# Patient Record
Sex: FEMALE | Race: OTHER | NOT HISPANIC OR LATINO | URBAN - METROPOLITAN AREA
[De-identification: names, ages, dates, MRNs, and addresses within clinical notes are randomized per-mention and may not be internally consistent; named-entity substitution may affect disease eponyms.]

---

## 2023-12-17 ENCOUNTER — EMERGENCY (EMERGENCY)
Facility: HOSPITAL | Age: 43
LOS: 1 days | Discharge: HOME | End: 2023-12-17
Attending: EMERGENCY MEDICINE
Payer: COMMERCIAL

## 2023-12-17 VITALS
TEMPERATURE: 98 F | OXYGEN SATURATION: 98 % | HEART RATE: 83 BPM | SYSTOLIC BLOOD PRESSURE: 108 MMHG | RESPIRATION RATE: 20 BRPM | WEIGHT: 164.91 LBS | DIASTOLIC BLOOD PRESSURE: 71 MMHG | HEIGHT: 66 IN

## 2023-12-17 VITALS
RESPIRATION RATE: 18 BRPM | TEMPERATURE: 98 F | OXYGEN SATURATION: 100 % | DIASTOLIC BLOOD PRESSURE: 70 MMHG | HEART RATE: 68 BPM | SYSTOLIC BLOOD PRESSURE: 107 MMHG

## 2023-12-17 LAB
ALBUMIN SERPL ELPH-MCNC: 4.2 G/DL — SIGNIFICANT CHANGE UP (ref 3.3–5)
ALBUMIN SERPL ELPH-MCNC: 4.2 G/DL — SIGNIFICANT CHANGE UP (ref 3.3–5)
ALP SERPL-CCNC: 32 U/L — LOW (ref 40–120)
ALP SERPL-CCNC: 32 U/L — LOW (ref 40–120)
ALT FLD-CCNC: 14 U/L — SIGNIFICANT CHANGE UP (ref 10–45)
ALT FLD-CCNC: 14 U/L — SIGNIFICANT CHANGE UP (ref 10–45)
ANION GAP SERPL CALC-SCNC: 9 MMOL/L — SIGNIFICANT CHANGE UP (ref 5–17)
APPEARANCE UR: CLEAR — SIGNIFICANT CHANGE UP
APPEARANCE UR: CLEAR — SIGNIFICANT CHANGE UP
AST SERPL-CCNC: 31 U/L — SIGNIFICANT CHANGE UP (ref 10–40)
AST SERPL-CCNC: 31 U/L — SIGNIFICANT CHANGE UP (ref 10–40)
BACTERIA # UR AUTO: NEGATIVE /HPF — SIGNIFICANT CHANGE UP
BACTERIA # UR AUTO: NEGATIVE /HPF — SIGNIFICANT CHANGE UP
BASE EXCESS BLDV CALC-SCNC: 2.6 MMOL/L — SIGNIFICANT CHANGE UP (ref -2–3)
BASE EXCESS BLDV CALC-SCNC: 2.6 MMOL/L — SIGNIFICANT CHANGE UP (ref -2–3)
BASOPHILS # BLD AUTO: 0.02 K/UL — SIGNIFICANT CHANGE UP (ref 0–0.2)
BASOPHILS # BLD AUTO: 0.02 K/UL — SIGNIFICANT CHANGE UP (ref 0–0.2)
BASOPHILS NFR BLD AUTO: 0.4 % — SIGNIFICANT CHANGE UP (ref 0–2)
BASOPHILS NFR BLD AUTO: 0.4 % — SIGNIFICANT CHANGE UP (ref 0–2)
BILIRUB SERPL-MCNC: 0.2 MG/DL — SIGNIFICANT CHANGE UP (ref 0.2–1.2)
BILIRUB SERPL-MCNC: 0.2 MG/DL — SIGNIFICANT CHANGE UP (ref 0.2–1.2)
BILIRUB UR-MCNC: NEGATIVE — SIGNIFICANT CHANGE UP
BILIRUB UR-MCNC: NEGATIVE — SIGNIFICANT CHANGE UP
BUN SERPL-MCNC: 14 MG/DL — SIGNIFICANT CHANGE UP (ref 7–23)
BUN SERPL-MCNC: 14 MG/DL — SIGNIFICANT CHANGE UP (ref 7–23)
BUN SERPL-MCNC: 16 MG/DL — SIGNIFICANT CHANGE UP (ref 7–23)
BUN SERPL-MCNC: 16 MG/DL — SIGNIFICANT CHANGE UP (ref 7–23)
CA-I SERPL-SCNC: 1.23 MMOL/L — SIGNIFICANT CHANGE UP (ref 1.15–1.33)
CA-I SERPL-SCNC: 1.23 MMOL/L — SIGNIFICANT CHANGE UP (ref 1.15–1.33)
CALCIUM SERPL-MCNC: 9.3 MG/DL — SIGNIFICANT CHANGE UP (ref 8.4–10.5)
CANCER AG125 SERPL-ACNC: 8 U/ML — SIGNIFICANT CHANGE UP
CANCER AG125 SERPL-ACNC: 8 U/ML — SIGNIFICANT CHANGE UP
CANCER AG19-9 SERPL-ACNC: 8 U/ML — SIGNIFICANT CHANGE UP
CANCER AG19-9 SERPL-ACNC: 8 U/ML — SIGNIFICANT CHANGE UP
CAST: 0 /LPF — SIGNIFICANT CHANGE UP (ref 0–4)
CAST: 0 /LPF — SIGNIFICANT CHANGE UP (ref 0–4)
CEA SERPL-MCNC: 0.6 NG/ML — SIGNIFICANT CHANGE UP (ref 0–3.8)
CEA SERPL-MCNC: 0.6 NG/ML — SIGNIFICANT CHANGE UP (ref 0–3.8)
CHLORIDE BLDV-SCNC: 104 MMOL/L — SIGNIFICANT CHANGE UP (ref 96–108)
CHLORIDE BLDV-SCNC: 104 MMOL/L — SIGNIFICANT CHANGE UP (ref 96–108)
CHLORIDE SERPL-SCNC: 104 MMOL/L — SIGNIFICANT CHANGE UP (ref 96–108)
CHLORIDE SERPL-SCNC: 104 MMOL/L — SIGNIFICANT CHANGE UP (ref 96–108)
CHLORIDE SERPL-SCNC: 106 MMOL/L — SIGNIFICANT CHANGE UP (ref 96–108)
CHLORIDE SERPL-SCNC: 106 MMOL/L — SIGNIFICANT CHANGE UP (ref 96–108)
CO2 BLDV-SCNC: 30 MMOL/L — HIGH (ref 22–26)
CO2 BLDV-SCNC: 30 MMOL/L — HIGH (ref 22–26)
CO2 SERPL-SCNC: 24 MMOL/L — SIGNIFICANT CHANGE UP (ref 22–31)
CO2 SERPL-SCNC: 24 MMOL/L — SIGNIFICANT CHANGE UP (ref 22–31)
CO2 SERPL-SCNC: 26 MMOL/L — SIGNIFICANT CHANGE UP (ref 22–31)
CO2 SERPL-SCNC: 26 MMOL/L — SIGNIFICANT CHANGE UP (ref 22–31)
COLOR SPEC: YELLOW — SIGNIFICANT CHANGE UP
COLOR SPEC: YELLOW — SIGNIFICANT CHANGE UP
CREAT SERPL-MCNC: 0.62 MG/DL — SIGNIFICANT CHANGE UP (ref 0.5–1.3)
CREAT SERPL-MCNC: 0.62 MG/DL — SIGNIFICANT CHANGE UP (ref 0.5–1.3)
CREAT SERPL-MCNC: 0.68 MG/DL — SIGNIFICANT CHANGE UP (ref 0.5–1.3)
CREAT SERPL-MCNC: 0.68 MG/DL — SIGNIFICANT CHANGE UP (ref 0.5–1.3)
DIFF PNL FLD: NEGATIVE — SIGNIFICANT CHANGE UP
DIFF PNL FLD: NEGATIVE — SIGNIFICANT CHANGE UP
EGFR: 112 ML/MIN/1.73M2 — SIGNIFICANT CHANGE UP
EGFR: 112 ML/MIN/1.73M2 — SIGNIFICANT CHANGE UP
EGFR: 115 ML/MIN/1.73M2 — SIGNIFICANT CHANGE UP
EGFR: 115 ML/MIN/1.73M2 — SIGNIFICANT CHANGE UP
EOSINOPHIL # BLD AUTO: 0.05 K/UL — SIGNIFICANT CHANGE UP (ref 0–0.5)
EOSINOPHIL # BLD AUTO: 0.05 K/UL — SIGNIFICANT CHANGE UP (ref 0–0.5)
EOSINOPHIL NFR BLD AUTO: 1.1 % — SIGNIFICANT CHANGE UP (ref 0–6)
EOSINOPHIL NFR BLD AUTO: 1.1 % — SIGNIFICANT CHANGE UP (ref 0–6)
GAS PNL BLDV: 135 MMOL/L — LOW (ref 136–145)
GAS PNL BLDV: 135 MMOL/L — LOW (ref 136–145)
GAS PNL BLDV: SIGNIFICANT CHANGE UP
GLUCOSE BLDV-MCNC: 87 MG/DL — SIGNIFICANT CHANGE UP (ref 70–99)
GLUCOSE BLDV-MCNC: 87 MG/DL — SIGNIFICANT CHANGE UP (ref 70–99)
GLUCOSE SERPL-MCNC: 80 MG/DL — SIGNIFICANT CHANGE UP (ref 70–99)
GLUCOSE SERPL-MCNC: 80 MG/DL — SIGNIFICANT CHANGE UP (ref 70–99)
GLUCOSE SERPL-MCNC: 89 MG/DL — SIGNIFICANT CHANGE UP (ref 70–99)
GLUCOSE SERPL-MCNC: 89 MG/DL — SIGNIFICANT CHANGE UP (ref 70–99)
GLUCOSE UR QL: NEGATIVE MG/DL — SIGNIFICANT CHANGE UP
GLUCOSE UR QL: NEGATIVE MG/DL — SIGNIFICANT CHANGE UP
HCG SERPL-ACNC: <2 MIU/ML — SIGNIFICANT CHANGE UP
HCG SERPL-ACNC: <2 MIU/ML — SIGNIFICANT CHANGE UP
HCO3 BLDV-SCNC: 29 MMOL/L — SIGNIFICANT CHANGE UP (ref 22–29)
HCO3 BLDV-SCNC: 29 MMOL/L — SIGNIFICANT CHANGE UP (ref 22–29)
HCT VFR BLD CALC: 37.2 % — SIGNIFICANT CHANGE UP (ref 34.5–45)
HCT VFR BLD CALC: 37.2 % — SIGNIFICANT CHANGE UP (ref 34.5–45)
HCT VFR BLDA CALC: 44 % — SIGNIFICANT CHANGE UP (ref 34.5–46.5)
HCT VFR BLDA CALC: 44 % — SIGNIFICANT CHANGE UP (ref 34.5–46.5)
HGB BLD CALC-MCNC: 14.7 G/DL — SIGNIFICANT CHANGE UP (ref 11.7–16.1)
HGB BLD CALC-MCNC: 14.7 G/DL — SIGNIFICANT CHANGE UP (ref 11.7–16.1)
HGB BLD-MCNC: 12.2 G/DL — SIGNIFICANT CHANGE UP (ref 11.5–15.5)
HGB BLD-MCNC: 12.2 G/DL — SIGNIFICANT CHANGE UP (ref 11.5–15.5)
IMM GRANULOCYTES NFR BLD AUTO: 0.2 % — SIGNIFICANT CHANGE UP (ref 0–0.9)
IMM GRANULOCYTES NFR BLD AUTO: 0.2 % — SIGNIFICANT CHANGE UP (ref 0–0.9)
KETONES UR-MCNC: ABNORMAL MG/DL
KETONES UR-MCNC: ABNORMAL MG/DL
LACTATE BLDV-MCNC: 1 MMOL/L — SIGNIFICANT CHANGE UP (ref 0.5–2)
LACTATE BLDV-MCNC: 1 MMOL/L — SIGNIFICANT CHANGE UP (ref 0.5–2)
LEUKOCYTE ESTERASE UR-ACNC: NEGATIVE — SIGNIFICANT CHANGE UP
LEUKOCYTE ESTERASE UR-ACNC: NEGATIVE — SIGNIFICANT CHANGE UP
LYMPHOCYTES # BLD AUTO: 1.82 K/UL — SIGNIFICANT CHANGE UP (ref 1–3.3)
LYMPHOCYTES # BLD AUTO: 1.82 K/UL — SIGNIFICANT CHANGE UP (ref 1–3.3)
LYMPHOCYTES # BLD AUTO: 39.5 % — SIGNIFICANT CHANGE UP (ref 13–44)
LYMPHOCYTES # BLD AUTO: 39.5 % — SIGNIFICANT CHANGE UP (ref 13–44)
MCHC RBC-ENTMCNC: 29.5 PG — SIGNIFICANT CHANGE UP (ref 27–34)
MCHC RBC-ENTMCNC: 29.5 PG — SIGNIFICANT CHANGE UP (ref 27–34)
MCHC RBC-ENTMCNC: 32.8 GM/DL — SIGNIFICANT CHANGE UP (ref 32–36)
MCHC RBC-ENTMCNC: 32.8 GM/DL — SIGNIFICANT CHANGE UP (ref 32–36)
MCV RBC AUTO: 90.1 FL — SIGNIFICANT CHANGE UP (ref 80–100)
MCV RBC AUTO: 90.1 FL — SIGNIFICANT CHANGE UP (ref 80–100)
MONOCYTES # BLD AUTO: 0.3 K/UL — SIGNIFICANT CHANGE UP (ref 0–0.9)
MONOCYTES # BLD AUTO: 0.3 K/UL — SIGNIFICANT CHANGE UP (ref 0–0.9)
MONOCYTES NFR BLD AUTO: 6.5 % — SIGNIFICANT CHANGE UP (ref 2–14)
MONOCYTES NFR BLD AUTO: 6.5 % — SIGNIFICANT CHANGE UP (ref 2–14)
NEUTROPHILS # BLD AUTO: 2.41 K/UL — SIGNIFICANT CHANGE UP (ref 1.8–7.4)
NEUTROPHILS # BLD AUTO: 2.41 K/UL — SIGNIFICANT CHANGE UP (ref 1.8–7.4)
NEUTROPHILS NFR BLD AUTO: 52.3 % — SIGNIFICANT CHANGE UP (ref 43–77)
NEUTROPHILS NFR BLD AUTO: 52.3 % — SIGNIFICANT CHANGE UP (ref 43–77)
NITRITE UR-MCNC: NEGATIVE — SIGNIFICANT CHANGE UP
NITRITE UR-MCNC: NEGATIVE — SIGNIFICANT CHANGE UP
NRBC # BLD: 0 /100 WBCS — SIGNIFICANT CHANGE UP (ref 0–0)
NRBC # BLD: 0 /100 WBCS — SIGNIFICANT CHANGE UP (ref 0–0)
PCO2 BLDV: 50 MMHG — HIGH (ref 39–42)
PCO2 BLDV: 50 MMHG — HIGH (ref 39–42)
PH BLDV: 7.37 — SIGNIFICANT CHANGE UP (ref 7.32–7.43)
PH BLDV: 7.37 — SIGNIFICANT CHANGE UP (ref 7.32–7.43)
PH UR: 7 — SIGNIFICANT CHANGE UP (ref 5–8)
PH UR: 7 — SIGNIFICANT CHANGE UP (ref 5–8)
PLATELET # BLD AUTO: 307 K/UL — SIGNIFICANT CHANGE UP (ref 150–400)
PLATELET # BLD AUTO: 307 K/UL — SIGNIFICANT CHANGE UP (ref 150–400)
PO2 BLDV: 29 MMHG — SIGNIFICANT CHANGE UP (ref 25–45)
PO2 BLDV: 29 MMHG — SIGNIFICANT CHANGE UP (ref 25–45)
POTASSIUM BLDV-SCNC: 5 MMOL/L — SIGNIFICANT CHANGE UP (ref 3.5–5.1)
POTASSIUM BLDV-SCNC: 5 MMOL/L — SIGNIFICANT CHANGE UP (ref 3.5–5.1)
POTASSIUM SERPL-MCNC: 4.4 MMOL/L — SIGNIFICANT CHANGE UP (ref 3.5–5.3)
POTASSIUM SERPL-MCNC: 4.4 MMOL/L — SIGNIFICANT CHANGE UP (ref 3.5–5.3)
POTASSIUM SERPL-MCNC: 5.7 MMOL/L — HIGH (ref 3.5–5.3)
POTASSIUM SERPL-MCNC: 5.7 MMOL/L — HIGH (ref 3.5–5.3)
POTASSIUM SERPL-SCNC: 4.4 MMOL/L — SIGNIFICANT CHANGE UP (ref 3.5–5.3)
POTASSIUM SERPL-SCNC: 4.4 MMOL/L — SIGNIFICANT CHANGE UP (ref 3.5–5.3)
POTASSIUM SERPL-SCNC: 5.7 MMOL/L — HIGH (ref 3.5–5.3)
POTASSIUM SERPL-SCNC: 5.7 MMOL/L — HIGH (ref 3.5–5.3)
PROT SERPL-MCNC: 7.7 G/DL — SIGNIFICANT CHANGE UP (ref 6–8.3)
PROT SERPL-MCNC: 7.7 G/DL — SIGNIFICANT CHANGE UP (ref 6–8.3)
PROT UR-MCNC: NEGATIVE MG/DL — SIGNIFICANT CHANGE UP
PROT UR-MCNC: NEGATIVE MG/DL — SIGNIFICANT CHANGE UP
RBC # BLD: 4.13 M/UL — SIGNIFICANT CHANGE UP (ref 3.8–5.2)
RBC # BLD: 4.13 M/UL — SIGNIFICANT CHANGE UP (ref 3.8–5.2)
RBC # FLD: 12 % — SIGNIFICANT CHANGE UP (ref 10.3–14.5)
RBC # FLD: 12 % — SIGNIFICANT CHANGE UP (ref 10.3–14.5)
RBC CASTS # UR COMP ASSIST: 1 /HPF — SIGNIFICANT CHANGE UP (ref 0–4)
RBC CASTS # UR COMP ASSIST: 1 /HPF — SIGNIFICANT CHANGE UP (ref 0–4)
SAO2 % BLDV: 41.7 % — LOW (ref 67–88)
SAO2 % BLDV: 41.7 % — LOW (ref 67–88)
SODIUM SERPL-SCNC: 137 MMOL/L — SIGNIFICANT CHANGE UP (ref 135–145)
SODIUM SERPL-SCNC: 137 MMOL/L — SIGNIFICANT CHANGE UP (ref 135–145)
SODIUM SERPL-SCNC: 141 MMOL/L — SIGNIFICANT CHANGE UP (ref 135–145)
SODIUM SERPL-SCNC: 141 MMOL/L — SIGNIFICANT CHANGE UP (ref 135–145)
SP GR SPEC: 1.02 — SIGNIFICANT CHANGE UP (ref 1–1.03)
SP GR SPEC: 1.02 — SIGNIFICANT CHANGE UP (ref 1–1.03)
SQUAMOUS # UR AUTO: 2 /HPF — SIGNIFICANT CHANGE UP (ref 0–5)
SQUAMOUS # UR AUTO: 2 /HPF — SIGNIFICANT CHANGE UP (ref 0–5)
UROBILINOGEN FLD QL: 0.2 MG/DL — SIGNIFICANT CHANGE UP (ref 0.2–1)
UROBILINOGEN FLD QL: 0.2 MG/DL — SIGNIFICANT CHANGE UP (ref 0.2–1)
WBC # BLD: 4.61 K/UL — SIGNIFICANT CHANGE UP (ref 3.8–10.5)
WBC # BLD: 4.61 K/UL — SIGNIFICANT CHANGE UP (ref 3.8–10.5)
WBC # FLD AUTO: 4.61 K/UL — SIGNIFICANT CHANGE UP (ref 3.8–10.5)
WBC # FLD AUTO: 4.61 K/UL — SIGNIFICANT CHANGE UP (ref 3.8–10.5)
WBC UR QL: 1 /HPF — SIGNIFICANT CHANGE UP (ref 0–5)
WBC UR QL: 1 /HPF — SIGNIFICANT CHANGE UP (ref 0–5)

## 2023-12-17 PROCEDURE — 93975 VASCULAR STUDY: CPT

## 2023-12-17 PROCEDURE — 87491 CHLMYD TRACH DNA AMP PROBE: CPT

## 2023-12-17 PROCEDURE — 93975 VASCULAR STUDY: CPT | Mod: 26

## 2023-12-17 PROCEDURE — 87800 DETECT AGNT MULT DNA DIREC: CPT

## 2023-12-17 PROCEDURE — 96375 TX/PRO/DX INJ NEW DRUG ADDON: CPT

## 2023-12-17 PROCEDURE — 82803 BLOOD GASES ANY COMBINATION: CPT

## 2023-12-17 PROCEDURE — 82378 CARCINOEMBRYONIC ANTIGEN: CPT

## 2023-12-17 PROCEDURE — 83605 ASSAY OF LACTIC ACID: CPT

## 2023-12-17 PROCEDURE — 85025 COMPLETE CBC W/AUTO DIFF WBC: CPT

## 2023-12-17 PROCEDURE — 84702 CHORIONIC GONADOTROPIN TEST: CPT

## 2023-12-17 PROCEDURE — 80048 BASIC METABOLIC PNL TOTAL CA: CPT

## 2023-12-17 PROCEDURE — 86301 IMMUNOASSAY TUMOR CA 19-9: CPT

## 2023-12-17 PROCEDURE — 87591 N.GONORRHOEAE DNA AMP PROB: CPT

## 2023-12-17 PROCEDURE — 87086 URINE CULTURE/COLONY COUNT: CPT

## 2023-12-17 PROCEDURE — 85014 HEMATOCRIT: CPT

## 2023-12-17 PROCEDURE — 99284 EMERGENCY DEPT VISIT MOD MDM: CPT

## 2023-12-17 PROCEDURE — 82947 ASSAY GLUCOSE BLOOD QUANT: CPT

## 2023-12-17 PROCEDURE — 85018 HEMOGLOBIN: CPT

## 2023-12-17 PROCEDURE — 99283 EMERGENCY DEPT VISIT LOW MDM: CPT

## 2023-12-17 PROCEDURE — 81001 URINALYSIS AUTO W/SCOPE: CPT

## 2023-12-17 PROCEDURE — 82330 ASSAY OF CALCIUM: CPT

## 2023-12-17 PROCEDURE — 82435 ASSAY OF BLOOD CHLORIDE: CPT

## 2023-12-17 PROCEDURE — 86304 IMMUNOASSAY TUMOR CA 125: CPT

## 2023-12-17 PROCEDURE — 87040 BLOOD CULTURE FOR BACTERIA: CPT

## 2023-12-17 PROCEDURE — 99285 EMERGENCY DEPT VISIT HI MDM: CPT | Mod: 25

## 2023-12-17 PROCEDURE — 84295 ASSAY OF SERUM SODIUM: CPT

## 2023-12-17 PROCEDURE — 80053 COMPREHEN METABOLIC PANEL: CPT

## 2023-12-17 PROCEDURE — 76830 TRANSVAGINAL US NON-OB: CPT

## 2023-12-17 PROCEDURE — 84132 ASSAY OF SERUM POTASSIUM: CPT

## 2023-12-17 PROCEDURE — 96374 THER/PROPH/DIAG INJ IV PUSH: CPT

## 2023-12-17 PROCEDURE — 76830 TRANSVAGINAL US NON-OB: CPT | Mod: 26

## 2023-12-17 RX ORDER — ACETAMINOPHEN 500 MG
1000 TABLET ORAL ONCE
Refills: 0 | Status: COMPLETED | OUTPATIENT
Start: 2023-12-17 | End: 2023-12-17

## 2023-12-17 RX ORDER — PHENAZOPYRIDINE HCL 100 MG
200 TABLET ORAL ONCE
Refills: 0 | Status: DISCONTINUED | OUTPATIENT
Start: 2023-12-17 | End: 2023-12-17

## 2023-12-17 RX ORDER — KETOROLAC TROMETHAMINE 30 MG/ML
15 SYRINGE (ML) INJECTION ONCE
Refills: 0 | Status: DISCONTINUED | OUTPATIENT
Start: 2023-12-17 | End: 2023-12-17

## 2023-12-17 RX ORDER — PHENAZOPYRIDINE HCL 100 MG
200 TABLET ORAL ONCE
Refills: 0 | Status: COMPLETED | OUTPATIENT
Start: 2023-12-17 | End: 2023-12-17

## 2023-12-17 RX ORDER — SODIUM CHLORIDE 9 MG/ML
1000 INJECTION INTRAMUSCULAR; INTRAVENOUS; SUBCUTANEOUS ONCE
Refills: 0 | Status: COMPLETED | OUTPATIENT
Start: 2023-12-17 | End: 2023-12-17

## 2023-12-17 RX ADMIN — SODIUM CHLORIDE 1000 MILLILITER(S): 9 INJECTION INTRAMUSCULAR; INTRAVENOUS; SUBCUTANEOUS at 10:11

## 2023-12-17 RX ADMIN — Medication 200 MILLIGRAM(S): at 11:31

## 2023-12-17 RX ADMIN — Medication 15 MILLIGRAM(S): at 10:11

## 2023-12-17 RX ADMIN — Medication 400 MILLIGRAM(S): at 10:13

## 2023-12-17 NOTE — CONSULT NOTE ADULT - ASSESSMENT
A/P: 42yo G0 (LMP 11/15) presenting with suprapubic pain, dysuria, and vaginal/vulvar pain for last several months. In the ED, VSS, PE with mild tenderness to deep palpation in lower abdomen, predominantly in suprapubic region, and no discharge or lesions seen on vaginal exam. Patient with grossly normal labs and UA. TVUS demonstrates 2.5 cm complex left ovarian cyst with peripheral nodularity. Vulvar/vaginal burning/pain possibly 2/2 vaginismus vs. irritation from shaving vs. yeast infection (less likely given no discharge on exam) vs. HSV (less likely given no herpetic lesions on exam). Patient is hemodynamically stable.  - No acute GYN intervention  - Can draw tumor markers to facilitate outpatient workup: CA-125, CEA, CA 19-9  - f/u UCx, vaginitis panel, GC/CT  - Patient may use the following pain regimen PRN: Alternate using Ibuprofen 600mg every 6 hours and Tylenol 975mg (2 extra strength or 3 regular tabs) every 6 hours for pain. Example pain schedule as follows: 9AM Tylenol 975mg, 12PM Ibuprofen 600mg, 3PM Tylenol 975mg, 6PM Ibuprofen 600mg, etc.  - Recommended silicone- and/or oil-based lubricants with intercourse. Also recommended limiting shaving of the vulva to prevent further irration.  - Recommend patient establish outpatient GYN care for management of ovarian cyst, in addition to management of chronic pelvic pain. Patient may establish care with [facility] if she desires. Please provide the clinic information below in patient's discharge paperwork:       [facility]       [address] [place] [address] [address]       [place] [address] [phone]  - Patient cleared for discharge from GYN perspective. Remainder of management per ED    Patient seen and d/w Dr. Melvi Guillermo, PGY-2 A/P: 42yo G0 (LMP 11/15) presenting with suprapubic pain, dysuria, and vaginal/vulvar pain for last several months. In the ED, VSS, PE with mild tenderness to deep palpation in lower abdomen, predominantly in suprapubic region, and no discharge or lesions seen on vaginal exam. Patient with grossly normal labs and UA. TVUS demonstrates 2.5 cm complex left ovarian cyst with peripheral nodularity. Vulvar/vaginal burning/pain possibly 2/2 vaginismus vs. irritation from shaving vs. yeast infection (less likely given no discharge on exam) vs. HSV (less likely given no herpetic lesions on exam). Patient is hemodynamically stable.  - No acute GYN intervention  - Can draw tumor markers to facilitate outpatient workup: CA-125, CEA, CA 19-9  - f/u UCx, vaginitis panel, GC/CT  - Patient may use the following pain regimen PRN: Alternate using Ibuprofen 600mg every 6 hours and Tylenol 975mg (2 extra strength or 3 regular tabs) every 6 hours for pain. Example pain schedule as follows: 9AM Tylenol 975mg, 12PM Ibuprofen 600mg, 3PM Tylenol 975mg, 6PM Ibuprofen 600mg, etc.  - Recommended silicone- and/or oil-based lubricants with intercourse. Also recommended limiting shaving of the vulva to prevent further irration.  - Recommend patient establish outpatient GYN care for management of ovarian cyst, in addition to management of chronic pelvic pain. Patient may establish care with [facility] if she desires. Please provide the clinic information below in patient's discharge paperwork:       [facility]       [address] [place] [address] [address]       [place] [address] [phone]  - Patient cleared for discharge from GYN perspective. Remainder of management per ED    Patient seen and d/w Dr. Melvi Guillermo, PGY-2 A/P: 42yo G0 (LMP 11/15) presenting with suprapubic pain, dysuria, and vaginal/vulvar pain for last several months. In the ED, VSS, PE with mild tenderness to deep palpation in lower abdomen, predominantly in suprapubic region, and no discharge or lesions seen on vaginal exam. Patient with grossly normal labs and UA. TVUS demonstrates 2.5 cm complex left ovarian cyst with peripheral nodularity. Vulvar/vaginal burning/pain possibly 2/2 vaginismus vs. irritation from shaving vs. yeast infection (less likely given no discharge on exam) vs. STI (less likely given no lesions or discharge on exam). Patient is hemodynamically stable.  - No acute GYN intervention  - Can draw tumor markers to facilitate outpatient workup: CA-125, CEA, CA 19-9  - f/u UCx, vaginitis panel, GC/CT  - Patient may use the following pain regimen PRN: Alternate using Ibuprofen 600mg every 6 hours and Tylenol 975mg (2 extra strength or 3 regular tabs) every 6 hours for pain. Example pain schedule as follows: 9AM Tylenol 975mg, 12PM Ibuprofen 600mg, 3PM Tylenol 975mg, 6PM Ibuprofen 600mg, etc.  - Recommended silicone- and/or oil-based lubricants with intercourse. Also recommended limiting shaving of the vulva to prevent further irration.  - Recommend patient establish outpatient GYN care for management of ovarian cyst, in addition to management of chronic pelvic pain. Patient may establish care with [facility] if she desires. Please provide the clinic information below in patient's discharge paperwork:       [facility]       [address] [place] [address] [address]       [place] [address] [phone]  - Patient cleared for discharge from GYN perspective. Remainder of management per ED    Patient seen and d/w Dr. Melvi Guillermo, PGY-2 A/P: 44yo G0 (LMP 11/15) presenting with suprapubic pain, dysuria, and vaginal/vulvar pain for last several months. In the ED, VSS, PE with mild tenderness to deep palpation in lower abdomen, predominantly in suprapubic region, and no discharge or lesions seen on vaginal exam. Patient with grossly normal labs and UA. TVUS demonstrates 2.5 cm complex left ovarian cyst with peripheral nodularity. Vulvar/vaginal burning/pain possibly 2/2 vaginismus vs. irritation from shaving vs. yeast infection (less likely given no discharge on exam) vs. STI (less likely given no lesions or discharge on exam). Patient is hemodynamically stable.  - No acute GYN intervention  - Can draw tumor markers to facilitate outpatient workup: CA-125, CEA, CA 19-9  - f/u UCx, vaginitis panel, GC/CT  - Patient may use the following pain regimen PRN: Alternate using Ibuprofen 600mg every 6 hours and Tylenol 975mg (2 extra strength or 3 regular tabs) every 6 hours for pain. Example pain schedule as follows: 9AM Tylenol 975mg, 12PM Ibuprofen 600mg, 3PM Tylenol 975mg, 6PM Ibuprofen 600mg, etc.  - Recommended silicone- and/or oil-based lubricants with intercourse. Also recommended limiting shaving of the vulva to prevent further irration.  - Recommend patient establish outpatient GYN care for management of ovarian cyst, in addition to management of chronic pelvic pain. Patient may establish care with [facility] if she desires. Please provide the clinic information below in patient's discharge paperwork:       [facility]       [address] [place] [address] [address]       [place] [address] [phone]  - Patient cleared for discharge from GYN perspective. Remainder of management per ED    Patient seen and d/w Dr. Melvi Guillermo, PGY-2

## 2023-12-17 NOTE — ED ADULT NURSE NOTE - OBJECTIVE STATEMENT
42 y/o F with no pertinent medical hx primarily Greenlandic speaking  at bedside translating.  pt c/o nausea, hematuria and suprapubic pain for 4 days. Pt states pain is much worst today and also has bilateral back pain. pt is A&Ox4  able to follow all commands. Pulse, motor, sensation present and equal in all 4 extremities. Denies HA, dizziness, blurry vision. Respirations spontaneous and unlabored on room air. Denies SOB, dyspnea, cough, CP, palpitations. Denies V/D/C. Abd soft ND. Denies fever, chills. No sick contacts. Skin intact, warm, dry, normal for race. 42 y/o F with no pertinent medical hx primarily Pashto speaking  at bedside translating.  pt c/o nausea, hematuria and suprapubic pain for 4 days. Pt states pain is much worst today and also has bilateral back pain. pt is A&Ox4  able to follow all commands. Pulse, motor, sensation present and equal in all 4 extremities. Denies HA, dizziness, blurry vision. Respirations spontaneous and unlabored on room air. Denies SOB, dyspnea, cough, CP, palpitations. Denies V/D/C. Abd soft ND. Denies fever, chills. No sick contacts. Skin intact, warm, dry, normal for race.

## 2023-12-17 NOTE — ED ADULT NURSE NOTE - NSFALLUNIVINTERV_ED_ALL_ED
Bed/Stretcher in lowest position, wheels locked, appropriate side rails in place/Call bell, personal items and telephone in reach/Instruct patient to call for assistance before getting out of bed/chair/stretcher/Non-slip footwear applied when patient is off stretcher/[place] to call system/Physically safe environment - no spills, clutter or unnecessary equipment/Purposeful proactive rounding/Room/bathroom lighting operational, light cord in reach Bed/Stretcher in lowest position, wheels locked, appropriate side rails in place/Call bell, personal items and telephone in reach/Instruct patient to call for assistance before getting out of bed/chair/stretcher/Non-slip footwear applied when patient is off stretcher/[place] to call system/Physically safe environment - no spills, clutter or unnecessary equipment/Purposeful proactive rounding/Room/bathroom lighting operational, light cord in reach

## 2023-12-17 NOTE — ED PROVIDER NOTE - CLINICAL SUMMARY MEDICAL DECISION MAKING FREE TEXT BOX
43-year-old obese female last menstrual period 1 month ago and she states for the last couple of month the.  Is much more longer and more bleeding, she never was pregnant, states   that  to her  has no normal spermatozoa   count , she did not go to OB and she did not have any sonogram. today she has minimal suprapubic pain and noticed some blood in her urine , no fever no chills  described dysuria   she does not want to have a pelvic exam at present time but agree for pelvic sonogram   concern for UTI, dysfunctional uterine bleeding. ovarian cyst, endometriosis,   will obtain blood work, urine and urine culture, transvaginal sonogram and reassess ZR

## 2023-12-17 NOTE — ED PROVIDER NOTE - PATIENT PORTAL LINK FT
You can access the FollowMyHealth Patient Portal offered by [facility] by registering at the following website: [email or web address]. By joining [facility]’s FollowMyHealth portal, you will also be able to view your health information using other applications (apps) compatible with our system. You can access the FollowMyHealth Patient Portal offered by [facility] by registering at the following website: [email or web address]. By joining [facility]’s FollowMyHealth portal, you will also be able to view your health information using other applications (apps) compatible with our system.

## 2023-12-17 NOTE — ED PROVIDER NOTE - OBJECTIVE STATEMENT
42 y/o female with no PMHx now presenting to the ED with suprapubic pain, nausea, dysuria and hematuria x4 days. Patient reported the pain is constant worsening and has difficulty walking 2/2 pain and has b/l lower back pain. Patient reported the pain started after having sexual intercourse with  5 days ago. Does not have GYN. Denied vaginal discharge, fever, vomiting, cough, urinary freq, CP, SOB, 44 y/o female with no PMHx now presenting to the ED with suprapubic pain, nausea, dysuria and hematuria x4 days. Patient reported the pain is constant worsening and has difficulty walking 2/2 pain and has b/l lower back pain. Patient reported the pain started after having sexual intercourse with  5 days ago. Does not have GYN. Denied vaginal discharge, fever, vomiting, cough, urinary freq, CP, SOB, 42 y/o female with no PMHx now presenting to the ED with suprapubic pain, nausea, dysuria and hematuria x4 days. Patient reported the pain is constant worsening and has difficulty walking 2/2 pain and has b/l lower back pain. Patient reported the pain started after having sexual intercourse with  5 days ago. Does not have GYN. Denied vaginal discharge, fever, vomiting, cough, urinary freq, CP, SOB,    Patient speaks Mohawk and prefers  to translate 44 y/o female with no PMHx now presenting to the ED with suprapubic pain, nausea, dysuria and hematuria x4 days. Patient reported the pain is constant worsening and has difficulty walking 2/2 pain and has b/l lower back pain. Patient reported the pain started after having sexual intercourse with  5 days ago. Does not have GYN. Denied vaginal discharge, fever, vomiting, cough, urinary freq, CP, SOB,    Patient speaks Romansh and prefers  to translate

## 2023-12-17 NOTE — CONSULT NOTE ADULT - ATTENDING COMMENTS
Patient seen at bedside. Agree with above resident's note. 42yo G0 presenting with suprapubic pain, dysuria, and vaginal/vulvar pain for last several months.    -VSS, labs reviewed  -Abdominal exam unremarkable for rebound/guarding, minimal suprapubic pressure  -Vaginal exam withough discharge, lesions, atrophy, excoriations.  -TVUS demonstrates 2.5 cm complex left ovarian cyst with peripheral nodularity.   -Vulvar/vaginal burning/pain possibly 2/2 vaginismus vs. irritation from shaving vs. yeast infection (less likely given no discharge on exam) vs. STI (less likely given no lesions or discharge on exam)  - No acute GYN intervention  - Can draw tumor markers to facilitate outpatient workup: CA-125, CEA, CA 19-9  - f/u UCx, vaginitis panel, GC/CT  - Pain regimen discussed  - Recommended silicone- and/or oil-based lubricants with intercourse. Also recommended limiting shaving of the vulva to prevent further irration.  - Information provided to establish outpatient GYN care    Karuna Ogden MD Patient seen at bedside. Agree with above resident's note. 44yo G0 presenting with suprapubic pain, dysuria, and vaginal/vulvar pain for last several months.    -VSS, labs reviewed  -Abdominal exam unremarkable for rebound/guarding, minimal suprapubic pressure  -Vaginal exam withough discharge, lesions, atrophy, excoriations.  -TVUS demonstrates 2.5 cm complex left ovarian cyst with peripheral nodularity.   -Vulvar/vaginal burning/pain possibly 2/2 vaginismus vs. irritation from shaving vs. yeast infection (less likely given no discharge on exam) vs. STI (less likely given no lesions or discharge on exam)  - No acute GYN intervention  - Can draw tumor markers to facilitate outpatient workup: CA-125, CEA, CA 19-9  - f/u UCx, vaginitis panel, GC/CT  - Pain regimen discussed  - Recommended silicone- and/or oil-based lubricants with intercourse. Also recommended limiting shaving of the vulva to prevent further irration.  - Information provided to establish outpatient GYN care    Karuna Ogden MD

## 2023-12-17 NOTE — ED PROVIDER NOTE - NS ED ATTENDING STATEMENT MOD
This was a shared visit with the [facility]. I reviewed and verified the documentation and independently performed the documented:

## 2023-12-17 NOTE — CONSULT NOTE ADULT - SUBJECTIVE AND OBJECTIVE BOX
CHARISMA DIAZ  43y  Female [id number]    History obtained with assistance from [employer] (Turkish): #[id number]    HPI: 44yo G0 (LMP 11/15) presenting with suprapubic pain, dysuria, and vaginal/vulvar pain for last several months. Patient states she has a 'burning sensation' with urinating that is worse with intercourse. States that the burning and itching have started to become more constant in recent months, though she does not scratch the area. Patient and partner do not use lubrication with intercourse. In the ED, patient with grossly normal labs and UA. Patient's abdominal pain improved with IV Tylenol and Toradol. TVUS demonstrates 2.5 cm complex left ovarian cyst with peripheral nodularity for which GYN was consulted.    Name of GYN Physician: None    ObHx: G0  GynHx: Denies h/o STIs  PMHx: Denies  SurgHx: open appendectomy ~15y ago  Meds: sulfa drugs (swelling)  Allergies: NKA  Social History: Denies smoking use, drug use, alcohol use.  FHx: reports 2 maternal cousins with breast cancer dx in 40s and 1 maternal cousin with uterine cancer    Vital Signs Last 24 Hrs  T(C): 36.7 (17 Dec 2023 10:12), Max: 36.8 (17 Dec 2023 08:33)  T(F): 98.1 (17 Dec 2023 10:12), Max: 98.2 (17 Dec 2023 08:33)  HR: 76 (17 Dec 2023 10:12) (76 - 83)  BP: 106/67 (17 Dec 2023 10:12) (106/67 - 108/71)  BP(mean): 79 (17 Dec 2023 10:12) (79 - 79)  RR: 18 (17 Dec 2023 10:12) (18 - 20)  SpO2: 100% (17 Dec 2023 10:12) (98% - 100%)    Parameters below as of 17 Dec 2023 10:12  Patient On (Oxygen Delivery Method): room air      Physical Exam:   General: sitting comfortably in bed, NAD   HEENT: neck supple, full ROM  CV: clinically well perfused  Lungs: unlabored respirations  Back: No CVA tenderness  Abd: Soft, non-distended, mild tenderness to deep palpation in lower abdomen, predominantly in suprapubic region, no rebound or guarding  : No bleeding on pad. External labia wnl. No evidence of excoriations. No lesions or discharge. Tenderness to light touch externally and with internal pelvic exam, though no cervical motion tenderness, uterus wnl, adnexa non palpable b/l. Cervix closed.  Speculum Exam: No lesions, bleeding, or discharge seen  Ext: non-tender b/l, no edema       LABS:                        12.2   4.61  )-----------( 307      ( 17 Dec 2023 10:27 )             37.2     12-17    137  |  104  |  16  ----------------------------<  80  5.7<H>   |  24  |  0.62    Ca    9.3      17 Dec 2023 10:27    TPro  7.7  /  Alb  4.2  /  TBili  0.2  /  DBili  x   /  AST  31  /  ALT  14  /  AlkPhos  32<L>  12-17    Urinalysis Basic - ( 17 Dec 2023 10:27 )    Color: x / Appearance: x / SG: x / pH: x  Gluc: 80 mg/dL / Ketone: x  / Bili: x / Urobili: x   Blood: x / Protein: x / Nitrite: x   Leuk Esterase: x / RBC: x / WBC x   Sq Epi: x / Non Sq Epi: x / Bacteria: x      RADIOLOGY & ADDITIONAL STUDIES:  < from: US Transvaginal (12.17.23 @ 12:50) >  ACC: [id number] EXAM:  US DPLX PELVIC   ORDERED BY:  DEV LUA     ACC: [id number] EXAM:  US TRANSVAGINAL   ORDERED BY:  DEV LUA     PROCEDURE DATE:  12/17/2023          INTERPRETATION:  CLINICAL INFORMATION: Pelvic pain    LMP: 11/15/2023    COMPARISON: None available.    TECHNIQUE:  Endovaginal and transabdominal pelvic sonogram. Color and Spectral   Doppler was performed.    FINDINGS:  Uterus: 7.3 x 6.0 x 6.5 cm. 4.8 x 4.3 x 4.5 cm uterine fibroid  Endometrium: 5 mm. Within normal limits.    Right ovary: 1.9 cm x 1.2 cm x 2.5 cm. Within normal limits. Duplex   Doppler flow is demonstrated for the right ovary.  Left ovary: 3.3 cm x 2.9 cm x 2.0 cm. 2.4 x 2.2 x 2.5 cm complex left   ovarian cyst with peripheral nodularity. Duplex Dopplerflow is   demonstrated for the left ovary.    Fluid: Trace pelvic free fluid in the cul-de-sac, likely physiologic for   age.    IMPRESSION:  4.8 cm uterine fibroid.    2.5 cm complex left ovarian cyst with peripheral nodularity; cystic   ovarian cancer cannot be excluded. If clinically indicated, pelvic MR   without and with IV contrast may be pursued for further evaluation.    --- End of Report ---            MARAL ALEX MD; Attending Radiologist  This document has been electronically signed. Dec 17 2023  1:01PM    < end of copied text >   CHARISMA DIAZ  43y  Female [id number]    History obtained with assistance from [employer] (Albanian): #[id number]    HPI: 44yo G0 (LMP 11/15) presenting with suprapubic pain, dysuria, and vaginal/vulvar pain for last several months. Patient states she has a 'burning sensation' with urinating that is worse with intercourse. States that the burning and itching have started to become more constant in recent months, though she does not scratch the area. Patient and partner do not use lubrication with intercourse. In the ED, patient with grossly normal labs and UA. Patient's abdominal pain improved with IV Tylenol and Toradol. TVUS demonstrates 2.5 cm complex left ovarian cyst with peripheral nodularity for which GYN was consulted.    Name of GYN Physician: None    ObHx: G0  GynHx: Denies h/o STIs  PMHx: Denies  SurgHx: open appendectomy ~15y ago  Meds: sulfa drugs (swelling)  Allergies: NKA  Social History: Denies smoking use, drug use, alcohol use.  FHx: reports 2 maternal cousins with breast cancer dx in 40s and 1 maternal cousin with uterine cancer    Vital Signs Last 24 Hrs  T(C): 36.7 (17 Dec 2023 10:12), Max: 36.8 (17 Dec 2023 08:33)  T(F): 98.1 (17 Dec 2023 10:12), Max: 98.2 (17 Dec 2023 08:33)  HR: 76 (17 Dec 2023 10:12) (76 - 83)  BP: 106/67 (17 Dec 2023 10:12) (106/67 - 108/71)  BP(mean): 79 (17 Dec 2023 10:12) (79 - 79)  RR: 18 (17 Dec 2023 10:12) (18 - 20)  SpO2: 100% (17 Dec 2023 10:12) (98% - 100%)    Parameters below as of 17 Dec 2023 10:12  Patient On (Oxygen Delivery Method): room air      Physical Exam:   General: sitting comfortably in bed, NAD   HEENT: neck supple, full ROM  CV: clinically well perfused  Lungs: unlabored respirations  Back: No CVA tenderness  Abd: Soft, non-distended, mild tenderness to deep palpation in lower abdomen, predominantly in suprapubic region, no rebound or guarding  : No bleeding on pad. External labia wnl. No evidence of excoriations. No lesions or discharge. Tenderness to light touch externally and with internal pelvic exam, though no cervical motion tenderness, uterus wnl, adnexa non palpable b/l. Cervix closed.  Speculum Exam: No lesions, bleeding, or discharge seen  Ext: non-tender b/l, no edema       LABS:                        12.2   4.61  )-----------( 307      ( 17 Dec 2023 10:27 )             37.2     12-17    137  |  104  |  16  ----------------------------<  80  5.7<H>   |  24  |  0.62    Ca    9.3      17 Dec 2023 10:27    TPro  7.7  /  Alb  4.2  /  TBili  0.2  /  DBili  x   /  AST  31  /  ALT  14  /  AlkPhos  32<L>  12-17    Urinalysis Basic - ( 17 Dec 2023 10:27 )    Color: x / Appearance: x / SG: x / pH: x  Gluc: 80 mg/dL / Ketone: x  / Bili: x / Urobili: x   Blood: x / Protein: x / Nitrite: x   Leuk Esterase: x / RBC: x / WBC x   Sq Epi: x / Non Sq Epi: x / Bacteria: x      RADIOLOGY & ADDITIONAL STUDIES:  < from: US Transvaginal (12.17.23 @ 12:50) >  ACC: [id number] EXAM:  US DPLX PELVIC   ORDERED BY:  DEV LUA     ACC: [id number] EXAM:  US TRANSVAGINAL   ORDERED BY:  DEV LUA     PROCEDURE DATE:  12/17/2023          INTERPRETATION:  CLINICAL INFORMATION: Pelvic pain    LMP: 11/15/2023    COMPARISON: None available.    TECHNIQUE:  Endovaginal and transabdominal pelvic sonogram. Color and Spectral   Doppler was performed.    FINDINGS:  Uterus: 7.3 x 6.0 x 6.5 cm. 4.8 x 4.3 x 4.5 cm uterine fibroid  Endometrium: 5 mm. Within normal limits.    Right ovary: 1.9 cm x 1.2 cm x 2.5 cm. Within normal limits. Duplex   Doppler flow is demonstrated for the right ovary.  Left ovary: 3.3 cm x 2.9 cm x 2.0 cm. 2.4 x 2.2 x 2.5 cm complex left   ovarian cyst with peripheral nodularity. Duplex Dopplerflow is   demonstrated for the left ovary.    Fluid: Trace pelvic free fluid in the cul-de-sac, likely physiologic for   age.    IMPRESSION:  4.8 cm uterine fibroid.    2.5 cm complex left ovarian cyst with peripheral nodularity; cystic   ovarian cancer cannot be excluded. If clinically indicated, pelvic MR   without and with IV contrast may be pursued for further evaluation.    --- End of Report ---            MARAL ALEX MD; Attending Radiologist  This document has been electronically signed. Dec 17 2023  1:01PM    < end of copied text >   CHARISMA DIAZ  43y  Female [id number]    History obtained with assistance from [employer] (Portuguese): #[id number]    HPI: 42yo G0 (LMP 11/15) presenting with suprapubic pain, dysuria, and vaginal/vulvar pain for last several months. Patient states she has a 'burning sensation' with urinating that is worse with intercourse. States that the burning and itching have started to become more constant in recent months, though she does not scratch the area. Patient and partner do not use lubrication with intercourse. Denies fevers/chills, nausea, vomiting, CP, SOB, vaginal discharge, changes in bowel habits. In the ED, patient with grossly normal labs and UA. Patient's abdominal pain improved with IV Tylenol and Toradol. TVUS demonstrates 2.5 cm complex left ovarian cyst with peripheral nodularity for which GYN was consulted.    Name of GYN Physician: None    ObHx: G0  GynHx: Denies h/o STIs  PMHx: Denies  SurgHx: open appendectomy ~15y ago  Meds: sulfa drugs (swelling)  Allergies: NKA  Social History: Denies smoking use, drug use, alcohol use.  FHx: reports 2 maternal cousins with breast cancer dx in 40s and 1 maternal cousin with uterine cancer    Vital Signs Last 24 Hrs  T(C): 36.7 (17 Dec 2023 10:12), Max: 36.8 (17 Dec 2023 08:33)  T(F): 98.1 (17 Dec 2023 10:12), Max: 98.2 (17 Dec 2023 08:33)  HR: 76 (17 Dec 2023 10:12) (76 - 83)  BP: 106/67 (17 Dec 2023 10:12) (106/67 - 108/71)  BP(mean): 79 (17 Dec 2023 10:12) (79 - 79)  RR: 18 (17 Dec 2023 10:12) (18 - 20)  SpO2: 100% (17 Dec 2023 10:12) (98% - 100%)    Parameters below as of 17 Dec 2023 10:12  Patient On (Oxygen Delivery Method): room air      Physical Exam:   General: sitting comfortably in bed, NAD   HEENT: neck supple, full ROM  CV: clinically well perfused  Lungs: unlabored respirations  Back: No CVA tenderness  Abd: Soft, non-distended, mild tenderness to deep palpation in lower abdomen, predominantly in suprapubic region, no rebound or guarding  : No bleeding on pad. External labia wnl. No evidence of excoriations. No lesions or discharge. Tenderness to light touch externally and with internal pelvic exam, though no cervical motion tenderness, uterus wnl, adnexa non palpable b/l. Cervix closed.  Speculum Exam: No lesions, bleeding, or discharge seen  Ext: non-tender b/l, no edema       LABS:                        12.2   4.61  )-----------( 307      ( 17 Dec 2023 10:27 )             37.2     12-17    137  |  104  |  16  ----------------------------<  80  5.7<H>   |  24  |  0.62    Ca    9.3      17 Dec 2023 10:27    TPro  7.7  /  Alb  4.2  /  TBili  0.2  /  DBili  x   /  AST  31  /  ALT  14  /  AlkPhos  32<L>  12-17    Urinalysis Basic - ( 17 Dec 2023 10:27 )    Color: x / Appearance: x / SG: x / pH: x  Gluc: 80 mg/dL / Ketone: x  / Bili: x / Urobili: x   Blood: x / Protein: x / Nitrite: x   Leuk Esterase: x / RBC: x / WBC x   Sq Epi: x / Non Sq Epi: x / Bacteria: x      RADIOLOGY & ADDITIONAL STUDIES:  < from: US Transvaginal (12.17.23 @ 12:50) >  ACC: [id number] EXAM:  US DPLX PELVIC   ORDERED BY:  DEV LUA     ACC: [id number] EXAM:  US TRANSVAGINAL   ORDERED BY:  DEV LUA     PROCEDURE DATE:  12/17/2023          INTERPRETATION:  CLINICAL INFORMATION: Pelvic pain    LMP: 11/15/2023    COMPARISON: None available.    TECHNIQUE:  Endovaginal and transabdominal pelvic sonogram. Color and Spectral   Doppler was performed.    FINDINGS:  Uterus: 7.3 x 6.0 x 6.5 cm. 4.8 x 4.3 x 4.5 cm uterine fibroid  Endometrium: 5 mm. Within normal limits.    Right ovary: 1.9 cm x 1.2 cm x 2.5 cm. Within normal limits. Duplex   Doppler flow is demonstrated for the right ovary.  Left ovary: 3.3 cm x 2.9 cm x 2.0 cm. 2.4 x 2.2 x 2.5 cm complex left   ovarian cyst with peripheral nodularity. Duplex Dopplerflow is   demonstrated for the left ovary.    Fluid: Trace pelvic free fluid in the cul-de-sac, likely physiologic for   age.    IMPRESSION:  4.8 cm uterine fibroid.    2.5 cm complex left ovarian cyst with peripheral nodularity; cystic   ovarian cancer cannot be excluded. If clinically indicated, pelvic MR   without and with IV contrast may be pursued for further evaluation.    --- End of Report ---            MARAL ALEX MD; Attending Radiologist  This document has been electronically signed. Dec 17 2023  1:01PM    < end of copied text >   CHARISMA DIAZ  43y  Female [id number]    History obtained with assistance from [employer] (Latvian): #[id number]    HPI: 44yo G0 (LMP 11/15) presenting with suprapubic pain, dysuria, and vaginal/vulvar pain for last several months. Patient states she has a 'burning sensation' with urinating that is worse with intercourse. States that the burning and itching have started to become more constant in recent months, though she does not scratch the area. Patient and partner do not use lubrication with intercourse. Denies fevers/chills, nausea, vomiting, CP, SOB, vaginal discharge, changes in bowel habits. In the ED, patient with grossly normal labs and UA. Patient's abdominal pain improved with IV Tylenol and Toradol. TVUS demonstrates 2.5 cm complex left ovarian cyst with peripheral nodularity for which GYN was consulted.    Name of GYN Physician: None    ObHx: G0  GynHx: Denies h/o STIs  PMHx: Denies  SurgHx: open appendectomy ~15y ago  Meds: sulfa drugs (swelling)  Allergies: NKA  Social History: Denies smoking use, drug use, alcohol use.  FHx: reports 2 maternal cousins with breast cancer dx in 40s and 1 maternal cousin with uterine cancer    Vital Signs Last 24 Hrs  T(C): 36.7 (17 Dec 2023 10:12), Max: 36.8 (17 Dec 2023 08:33)  T(F): 98.1 (17 Dec 2023 10:12), Max: 98.2 (17 Dec 2023 08:33)  HR: 76 (17 Dec 2023 10:12) (76 - 83)  BP: 106/67 (17 Dec 2023 10:12) (106/67 - 108/71)  BP(mean): 79 (17 Dec 2023 10:12) (79 - 79)  RR: 18 (17 Dec 2023 10:12) (18 - 20)  SpO2: 100% (17 Dec 2023 10:12) (98% - 100%)    Parameters below as of 17 Dec 2023 10:12  Patient On (Oxygen Delivery Method): room air      Physical Exam:   General: sitting comfortably in bed, NAD   HEENT: neck supple, full ROM  CV: clinically well perfused  Lungs: unlabored respirations  Back: No CVA tenderness  Abd: Soft, non-distended, mild tenderness to deep palpation in lower abdomen, predominantly in suprapubic region, no rebound or guarding  : No bleeding on pad. External labia wnl. No evidence of excoriations. No lesions or discharge. Tenderness to light touch externally and with internal pelvic exam, though no cervical motion tenderness, uterus wnl, adnexa non palpable b/l. Cervix closed.  Speculum Exam: No lesions, bleeding, or discharge seen  Ext: non-tender b/l, no edema       LABS:                        12.2   4.61  )-----------( 307      ( 17 Dec 2023 10:27 )             37.2     12-17    137  |  104  |  16  ----------------------------<  80  5.7<H>   |  24  |  0.62    Ca    9.3      17 Dec 2023 10:27    TPro  7.7  /  Alb  4.2  /  TBili  0.2  /  DBili  x   /  AST  31  /  ALT  14  /  AlkPhos  32<L>  12-17    Urinalysis Basic - ( 17 Dec 2023 10:27 )    Color: x / Appearance: x / SG: x / pH: x  Gluc: 80 mg/dL / Ketone: x  / Bili: x / Urobili: x   Blood: x / Protein: x / Nitrite: x   Leuk Esterase: x / RBC: x / WBC x   Sq Epi: x / Non Sq Epi: x / Bacteria: x      RADIOLOGY & ADDITIONAL STUDIES:  < from: US Transvaginal (12.17.23 @ 12:50) >  ACC: [id number] EXAM:  US DPLX PELVIC   ORDERED BY:  DEV LUA     ACC: [id number] EXAM:  US TRANSVAGINAL   ORDERED BY:  DEV LUA     PROCEDURE DATE:  12/17/2023          INTERPRETATION:  CLINICAL INFORMATION: Pelvic pain    LMP: 11/15/2023    COMPARISON: None available.    TECHNIQUE:  Endovaginal and transabdominal pelvic sonogram. Color and Spectral   Doppler was performed.    FINDINGS:  Uterus: 7.3 x 6.0 x 6.5 cm. 4.8 x 4.3 x 4.5 cm uterine fibroid  Endometrium: 5 mm. Within normal limits.    Right ovary: 1.9 cm x 1.2 cm x 2.5 cm. Within normal limits. Duplex   Doppler flow is demonstrated for the right ovary.  Left ovary: 3.3 cm x 2.9 cm x 2.0 cm. 2.4 x 2.2 x 2.5 cm complex left   ovarian cyst with peripheral nodularity. Duplex Dopplerflow is   demonstrated for the left ovary.    Fluid: Trace pelvic free fluid in the cul-de-sac, likely physiologic for   age.    IMPRESSION:  4.8 cm uterine fibroid.    2.5 cm complex left ovarian cyst with peripheral nodularity; cystic   ovarian cancer cannot be excluded. If clinically indicated, pelvic MR   without and with IV contrast may be pursued for further evaluation.    --- End of Report ---            MARAL ALEX MD; Attending Radiologist  This document has been electronically signed. Dec 17 2023  1:01PM    < end of copied text >

## 2023-12-18 LAB
C TRACH RRNA SPEC QL NAA+PROBE: SIGNIFICANT CHANGE UP
C TRACH RRNA SPEC QL NAA+PROBE: SIGNIFICANT CHANGE UP
CULTURE RESULTS: SIGNIFICANT CHANGE UP
CULTURE RESULTS: SIGNIFICANT CHANGE UP
N GONORRHOEA RRNA SPEC QL NAA+PROBE: SIGNIFICANT CHANGE UP
N GONORRHOEA RRNA SPEC QL NAA+PROBE: SIGNIFICANT CHANGE UP
SPECIMEN SOURCE: SIGNIFICANT CHANGE UP

## 2023-12-20 ENCOUNTER — EMERGENCY (EMERGENCY)
Facility: HOSPITAL | Age: 43
LOS: 1 days | Discharge: HOME | End: 2023-12-20
Attending: EMERGENCY MEDICINE
Payer: COMMERCIAL

## 2023-12-20 VITALS
WEIGHT: 187.39 LBS | HEIGHT: 66 IN | TEMPERATURE: 98 F | HEART RATE: 78 BPM | OXYGEN SATURATION: 99 % | RESPIRATION RATE: 16 BRPM | DIASTOLIC BLOOD PRESSURE: 78 MMHG | SYSTOLIC BLOOD PRESSURE: 109 MMHG

## 2023-12-20 VITALS
DIASTOLIC BLOOD PRESSURE: 78 MMHG | HEART RATE: 74 BPM | OXYGEN SATURATION: 98 % | TEMPERATURE: 98 F | SYSTOLIC BLOOD PRESSURE: 111 MMHG | RESPIRATION RATE: 18 BRPM

## 2023-12-20 PROBLEM — Z00.00 ENCOUNTER FOR PREVENTIVE HEALTH EXAMINATION: Status: ACTIVE | Noted: 2023-12-20

## 2023-12-20 PROCEDURE — 99284 EMERGENCY DEPT VISIT MOD MDM: CPT

## 2023-12-20 RX ORDER — PHENAZOPYRIDINE HCL 100 MG
1 TABLET ORAL
Qty: 15 | Refills: 0
Start: 2023-12-20 | End: 2023-12-24

## 2023-12-20 NOTE — ED PROVIDER NOTE - PHYSICAL EXAMINATION
GEN: Pt in NAD, A&O x3. GCS 15  EYES: Sclera white w/o injection, PERRLA, EOMI.  ENT: Head NCAT. Nose without deformity, turbinates without edema or erythema, no DC. No auricular TTP. Mouth and pharynx without erythema or exudates, uvula midline, no tonsillar enlargement. Neck supple FROM.   RESP: No chest wall tenderness, CTA b/l, no wheezes, rales, or rhonchi.   CARDIAC: RRR, clear distinct S1, S2, no murmurs, gallops, or rubs.   ABD: Abdomen non-distended, soft, non-tender, no rebound or guarding. No CVAT b/l.  GYN: physiologic discharge visualized. Severe pain with palpation along vaginal canal. Exam chaperoned by MISHEL Mattson on record.  VASC: 2+ carotid, radial, and dorsalis pedis pulses b/l. No edema or tenderness of the lower extremities.  NEURO: CN 2-12 grossly intact. Normal and equal sensation UE, LE and face b/l. 5/5 strength UE and LE b/l.  Pronator drift negative. Normal gait and gross cerebellar functioning.  MSK: No joint erythema or obvious deformities. Spine without obvious deformity. No midline or paraspinal tenderness. FROM w/o pain of upper and lower extremities b/l.  SKIN: No rashes noted.

## 2023-12-20 NOTE — ED PROVIDER NOTE - NSPTACCESSSVCSAPPTDETAILS_ED_ALL_ED_FT
Patient has dysparenunia and severe pain with introduction of speculum for pelvic exam. STI testing neg.

## 2023-12-20 NOTE — ED ADULT NURSE NOTE - NSFALLUNIVINTERV_ED_ALL_ED
Bed/Stretcher in lowest position, wheels locked, appropriate side rails in place/Call bell, personal items and telephone in reach/Instruct patient to call for assistance before getting out of bed/chair/stretcher/Non-slip footwear applied when patient is off stretcher/[place] to call system/Physically safe environment - no spills, clutter or unnecessary equipment/Purposeful proactive rounding/Room/bathroom lighting operational, light cord in reach Bed/Stretcher in lowest position, wheels locked, appropriate side rails in place/Call bell, personal items and telephone in reach/Instruct patient to call for assistance before getting out of bed/chair/stretcher/Non-slip footwear applied when patient is off stretcher/[place] to call system/Physically safe environment - no spills, clutter or unnecessary equipment/Purposeful proactive rounding/Room/bathroom lighting operational, light cord in reach

## 2023-12-20 NOTE — ED PROVIDER NOTE - CLINICAL SUMMARY MEDICAL DECISION MAKING FREE TEXT BOX
Patient presents to ER for continued pelvic pain associated with urination and sexual intercourse. Labs reviewed from 12/17, neg for GC/chlamydia testing. Clinical exam inconsistent with candida vaginitis.     Given her labs are within normal limits from 3 days ago, shared decision making conducted with patient at bedside. Patient and spouse express they wish to expedite their GYN appointment, and agrees that repeat blood testing is not needed at this time as she does not have any changes in system. Discussed utility of CT abd/pelvis in setting of her pelvic pain being low benefit and high risk of radiation. Patient and spouse declined at this time.    Discussed patient's situation with Referral specialist of ED to help expedite appointment.    Prescribed phenazopyridine TID for pain control associated with urination and vaginal emollients.

## 2023-12-20 NOTE — ED ADULT NURSE NOTE - OBJECTIVE STATEMENT
Pt 42 y/o female, AxOx3, presents to ED from home complaining of persistent vaginal burning. Pt seen in ED few days ago, had neg workup for STI, UTI. Unable to get close follow up with GYN prompting ED visit. Pt is well appearing, speaking full sentences without difficulty. Breathing spontaneous and unlabored.  MD Rodriguez at bedside for assessment, vaginal exam.

## 2023-12-20 NOTE — ED ADULT NURSE NOTE - CHIEF COMPLAINT QUOTE
pt c/o vaginal "burning" x 6 months  pt seen in ED on Sunday for same symptoms  pt denies vaginal bleeding/discharge

## 2023-12-20 NOTE — ED PROVIDER NOTE - PATIENT PORTAL LINK FT
You can access the FollowMyHealth Patient Portal offered by [facility] by registering at the following website: [email or web address]. By joining [employer]’s FollowMyHealth portal, you will also be able to view your health information using other applications (apps) compatible with our system. You can access the FollowMyHealth Patient Portal offered by [facility] by registering at the following website: [email or web address]. By joining [employer]’s FollowMyHealth portal, you will also be able to view your health information using other applications (apps) compatible with our system.

## 2023-12-20 NOTE — ED PROVIDER NOTE - OBJECTIVE STATEMENT
42 yo f G0 LMP 11/15 presents to ED for dysparenunia and dysuria for many months. Reports her menstruation has not been regular over past year. Denies fevers, chills, nausea, vomiting. States they attempted to establish care with GYN clinic but was given an appointment in 3 weeks and she continues to have pain with urination prompting her to come to ER. No dysuria, no hematuria.  for Telugu [id number] used,  accompanied patient at bedside per pt request. 44 yo f G0 LMP 11/15 presents to ED for dysparenunia and dysuria for many months. Reports her menstruation has not been regular over past year. Denies fevers, chills, nausea, vomiting. States they attempted to establish care with GYN clinic but was given an appointment in 3 weeks and she continues to have pain with urination prompting her to come to ER. No dysuria, no hematuria.  for Serbian [id number] used,  accompanied patient at bedside per pt request.

## 2023-12-20 NOTE — ED PROVIDER NOTE - NSFOLLOWUPINSTRUCTIONS_ED_ALL_ED_FT
Please take the phenazopyridine medication as prescribed for bladder spasms. This medication may turn your urine orange temporarily.     You may also use the vaginal emollient as needed for relief.    Please follow up with your GYN doctor for routine follow up of your previously identified cyst and fibroid.

## 2023-12-20 NOTE — ED PROVIDER NOTE - ATTENDING CONTRIBUTION TO CARE
Ruel Bedolla MD:  I personally saw the patient and performed a substantive portion of the visit including all aspects of the medical decision making.    MDM: 43-year-old female who presents with dysuria and pelvic pain and pain with sex for the last several months.  Menses have been irregular for the past 1 year.  Patient was seen in the ED for the same symptoms on 12/17/2023 and had labs and imaging performed.  Labs showed no leukocytosis, no electrolyte abnormalities, no lactic acidosis.  Urinalysis negative for acute UTI.  Urine culture was negative.  Ultrasound transvaginal showed 4.8 cm uterine fibroid, 2.5 cm complex left ovarian cyst.  Gonorrhea and Chlamydia negative, vaginitis screen negative.  OB/GYN was consulted in the ED and recommended no further ED evaluation and no acute gynecologic intervention.  Patient comes back today because of difficulty obtaining follow-up with OB/GYN.    On examination, patient with stable vitals, well-appearing, in no acute distress. Cardiac examination RRR, lungs CTAB, abdomen soft and nondistended, no guarding/rigidity/rebound, (+) minimal tenderness to the left lower quadrant, no CVA tenderness, neurovascularly intact in all 4 extremities.  Pelvic examination performed by Dr. Rodriguez, see physical exam.    No evidence of acute pelvic or intra-abdominal pathology.  Shared decision making had with patient and  regarding tenderness to left lower quadrant, offered CT imaging, but joint decision made to monitor symptoms and trial constipation medications given that she has been constipated, normally has bowel movement once every 4 days.  Denies any surgical history to the abdomen or any obstipation.    Discussed with patient referral coordinator who saw patient and will help arrange more expedited OB/GYN follow-up.  Stable for discharge with close follow-up and strict return precautions. Discussed the indications and side-effects of applicable medications. The patient has been informed of all concerning signs and symptoms to return to Emergency Department, the necessity to follow up with PMD within 2-3 days and OB/GYN within 1 to 2 weeks was explained, or to return to the ED if unable to follow-up appropriately, and the patient reports understanding of above with capacity and insight.    Differential includes but is not limited to: See above    Patient with new problems requiring additional work-up and treatment, following orders: see above  Discussed with: Referral coordinator  Obtained and reviewed external records: Prior ED note, prior OB/GYN note from 3 days ago  Additional history obtained from:  at bedside  Chronic conditions and social determinants of health affecting care: see above   utilized for all communications.

## 2023-12-20 NOTE — ED PROVIDER NOTE - CONDITION AT DISCHARGE:
----- Message from Susie Abdul sent at 3/22/2022 10:33 AM EDT -----  Subject: Referral Request    QUESTIONS   Reason for referral request? neurology clinic [address] through   [facility] phone [phone] Fax [phone] PT would like a second opinion   for his parkinson needs referral to this location and they said it needs   to specifically say he has parkinson   Has the physician seen you for this condition before? Yes  Select a date? 2021-10-22  Select the Provider the patient wants to be referred to, if known (PCP or   Specialist)? Outside Physician - neurology clinic   Preferred Specialist (if applicable)? Do you already have an appointment scheduled? No  Additional Information for Provider?   ---------------------------------------------------------------------------  --------------  CALL BACK INFO  What is the best way for the office to contact you? OK to leave message on   voicemail  Preferred Call Back Phone Number?  [phone] Satisfactory

## 2023-12-22 ENCOUNTER — APPOINTMENT (OUTPATIENT)
Dept: OBGYN | Facility: CLINIC | Age: 43
End: 2023-12-22
Payer: MEDICAID

## 2023-12-22 VITALS
DIASTOLIC BLOOD PRESSURE: 80 MMHG | HEIGHT: 66 IN | SYSTOLIC BLOOD PRESSURE: 142 MMHG | WEIGHT: 193 LBS | BODY MASS INDEX: 31.02 KG/M2

## 2023-12-22 DIAGNOSIS — R10.2 PELVIC AND PERINEAL PAIN: ICD-10-CM

## 2023-12-22 DIAGNOSIS — N95.1 MENOPAUSAL AND FEMALE CLIMACTERIC STATES: ICD-10-CM

## 2023-12-22 DIAGNOSIS — Z01.419 ENCOUNTER FOR GYNECOLOGICAL EXAMINATION (GENERAL) (ROUTINE) W/OUT ABNORMAL FINDINGS: ICD-10-CM

## 2023-12-22 LAB
CULTURE RESULTS: SIGNIFICANT CHANGE UP
SPECIMEN SOURCE: SIGNIFICANT CHANGE UP

## 2023-12-22 PROCEDURE — 99386 PREV VISIT NEW AGE 40-64: CPT

## 2023-12-22 RX ORDER — TRIAMCINOLONE ACETONIDE 1 MG/G
0.1 CREAM TOPICAL 3 TIMES DAILY
Qty: 1 | Refills: 0 | Status: ACTIVE | COMMUNITY
Start: 2023-12-22 | End: 1900-01-01

## 2023-12-22 RX ORDER — VENLAFAXINE 37.5 MG/1
37.5 TABLET ORAL DAILY
Qty: 30 | Refills: 5 | Status: ACTIVE | COMMUNITY
Start: 2023-12-22 | End: 1900-01-01

## 2023-12-26 ENCOUNTER — RESULT REVIEW (OUTPATIENT)
Age: 43
End: 2023-12-26

## 2023-12-26 ENCOUNTER — APPOINTMENT (OUTPATIENT)
Dept: MAMMOGRAPHY | Facility: CLINIC | Age: 43
End: 2023-12-26
Payer: MEDICAID

## 2023-12-26 PROCEDURE — 77067 SCR MAMMO BI INCL CAD: CPT

## 2023-12-26 PROCEDURE — 77063 BREAST TOMOSYNTHESIS BI: CPT

## 2023-12-27 ENCOUNTER — NON-APPOINTMENT (OUTPATIENT)
Age: 43
End: 2023-12-27

## 2023-12-27 DIAGNOSIS — N63.0 UNSPECIFIED LUMP IN UNSPECIFIED BREAST: ICD-10-CM

## 2023-12-27 LAB
BACTERIA UR CULT: NORMAL
BASOPHILS # BLD AUTO: 0.03 K/UL
BASOPHILS NFR BLD AUTO: 0.7 %
C TRACH RRNA SPEC QL NAA+PROBE: NOT DETECTED
CANDIDA VAG CYTO: NOT DETECTED
DHEA-S SERPL-MCNC: 97.2 UG/DL
EOSINOPHIL # BLD AUTO: 0.06 K/UL
EOSINOPHIL NFR BLD AUTO: 1.3 %
ESTRADIOL SERPL-MCNC: 12 PG/ML
FSH SERPL-MCNC: 75.6 IU/L
G VAGINALIS+PREV SP MTYP VAG QL MICRO: NOT DETECTED
HBV SURFACE AG SER QL: NONREACTIVE
HCT VFR BLD CALC: 36 %
HCV AB SER QL: NONREACTIVE
HCV S/CO RATIO: 0.15 S/CO
HGB BLD-MCNC: 11.7 G/DL
HIV1+2 AB SPEC QL IA.RAPID: NONREACTIVE
HPV HIGH+LOW RISK DNA PNL CVX: NOT DETECTED
IMM GRANULOCYTES NFR BLD AUTO: 0.4 %
LH SERPL-ACNC: 37.8 IU/L
LYMPHOCYTES # BLD AUTO: 2.05 K/UL
LYMPHOCYTES NFR BLD AUTO: 45.4 %
MAN DIFF?: NORMAL
MCHC RBC-ENTMCNC: 29.6 PG
MCHC RBC-ENTMCNC: 32.5 GM/DL
MCV RBC AUTO: 91.1 FL
MONOCYTES # BLD AUTO: 0.25 K/UL
MONOCYTES NFR BLD AUTO: 5.5 %
N GONORRHOEA RRNA SPEC QL NAA+PROBE: NOT DETECTED
NEUTROPHILS # BLD AUTO: 2.11 K/UL
NEUTROPHILS NFR BLD AUTO: 46.7 %
PLATELET # BLD AUTO: 278 K/UL
PROGEST SERPL-MCNC: 0.1 NG/ML
PROLACTIN SERPL-MCNC: 8.5 NG/ML
RBC # BLD: 3.95 M/UL
RBC # FLD: 12.5 %
SOURCE AMPLIFICATION: NORMAL
SOURCE TP AMPLIFICATION: NORMAL
T PALLIDUM AB SER QL IA: NEGATIVE
T VAGINALIS RRNA SPEC QL NAA+PROBE: NOT DETECTED
T VAGINALIS VAG QL WET PREP: NOT DETECTED
T4 FREE SERPL-MCNC: 1 NG/DL
TESTOST SERPL-MCNC: <2.5 NG/DL
TSH SERPL-ACNC: 1.64 UIU/ML
WBC # FLD AUTO: 4.52 K/UL

## 2023-12-27 NOTE — PLAN
[FreeTextEntry1] : Discussed ocp and vaginal estrogen but would not start until resolution or stability of ovarian cyst.  Discussed cyst and cancer risk.  Rpt sono 4 weeks. Lubricants moisturizers and steroid cream for vulvitis. FU 1 month.

## 2023-12-27 NOTE — HISTORY OF PRESENT ILLNESS
[FreeTextEntry1] : Check up. Vaginal discomfort x 1 year with hot flashes and pelvic discomfort.  Seen in ED for same and found to be perimenopausal with 2.5 cm complex ovarian cyst.  Needs mammo/son.  Desires sti screen.

## 2023-12-27 NOTE — PHYSICAL EXAM
[Chaperone Present] : A chaperone was present in the examining room during all aspects of the physical examination [Appropriately responsive] : appropriately responsive [Alert] : alert [No Acute Distress] : no acute distress [No Lymphadenopathy] : no lymphadenopathy [Soft] : soft [Non-tender] : non-tender [Non-distended] : non-distended [No HSM] : No HSM [No Lesions] : no lesions [No Mass] : no mass [Oriented x3] : oriented x3 [Examination Of The Breasts] : a normal appearance [No Masses] : no breast masses were palpable [Vulvitis] : vulvitis [Labia Majora] : normal [Labia Minora] : normal [Atrophy] : atrophy [Normal] : normal [Uterine Adnexae] : normal [Declined] : Patient declined rectal exam

## 2023-12-27 NOTE — REVIEW OF SYSTEMS
[SOB on Exertion] : shortness of breath on exertion [Constipation] : constipation [Bloating] : bloating [Dysuria] : dysuria [Genital Rash/Irritation] : genital rash/irritation [Breast Pain] : breast pain [Back Pain] : back pain [Anxiety] : anxiety [Depression] : depression [Sleep Disturbances] : sleep disturbances [Decreased Libido] : decreased libido [Feeling Weak] : feeling weak [Hot Flashes] : hot flashes [Muscle Weakness] : muscle weakness [History of Anemia] : history of anemia [Negative] : Heme/Lymph

## 2023-12-29 ENCOUNTER — NON-APPOINTMENT (OUTPATIENT)
Age: 43
End: 2023-12-29

## 2023-12-29 LAB — CYTOLOGY CVX/VAG DOC THIN PREP: NORMAL
